# Patient Record
Sex: MALE | Race: OTHER
[De-identification: names, ages, dates, MRNs, and addresses within clinical notes are randomized per-mention and may not be internally consistent; named-entity substitution may affect disease eponyms.]

---

## 2020-03-22 ENCOUNTER — HOSPITAL ENCOUNTER (INPATIENT)
Dept: HOSPITAL 15 - ER | Age: 65
LOS: 5 days | Discharge: HOME | DRG: 871 | End: 2020-03-27
Attending: INTERNAL MEDICINE | Admitting: HOSPITALIST
Payer: COMMERCIAL

## 2020-03-22 VITALS — WEIGHT: 311.95 LBS | BODY MASS INDEX: 36.09 KG/M2 | HEIGHT: 78 IN

## 2020-03-22 DIAGNOSIS — D64.9: ICD-10-CM

## 2020-03-22 DIAGNOSIS — E87.6: ICD-10-CM

## 2020-03-22 DIAGNOSIS — E11.22: ICD-10-CM

## 2020-03-22 DIAGNOSIS — N18.9: ICD-10-CM

## 2020-03-22 DIAGNOSIS — N40.0: ICD-10-CM

## 2020-03-22 DIAGNOSIS — R63.4: ICD-10-CM
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
## 2020-03-22 DIAGNOSIS — S09.8XXA: ICD-10-CM

## 2020-03-22 DIAGNOSIS — Z79.01: ICD-10-CM
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
## 2020-03-22 DIAGNOSIS — E66.9: ICD-10-CM

## 2020-03-22 DIAGNOSIS — I82.409: ICD-10-CM

## 2020-03-22 DIAGNOSIS — N17.9: ICD-10-CM

## 2020-03-22 DIAGNOSIS — S09.90XA: ICD-10-CM

## 2020-03-22 DIAGNOSIS — R31.9: ICD-10-CM

## 2020-03-22 DIAGNOSIS — E78.5: ICD-10-CM

## 2020-03-22 DIAGNOSIS — C67.9: ICD-10-CM

## 2020-03-22 DIAGNOSIS — I48.91: ICD-10-CM

## 2020-03-22 DIAGNOSIS — R65.21: ICD-10-CM

## 2020-03-22 DIAGNOSIS — I50.9: ICD-10-CM

## 2020-03-22 DIAGNOSIS — D68.59: ICD-10-CM

## 2020-03-22 DIAGNOSIS — E11.65: ICD-10-CM

## 2020-03-22 DIAGNOSIS — B95.8: ICD-10-CM

## 2020-03-22 DIAGNOSIS — I13.0: ICD-10-CM

## 2020-03-22 DIAGNOSIS — A41.9: Primary | ICD-10-CM
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
## 2020-03-22 DIAGNOSIS — I71.2: ICD-10-CM

## 2020-03-22 DIAGNOSIS — N39.0: ICD-10-CM

## 2020-03-22 DIAGNOSIS — T50.991A: ICD-10-CM

## 2020-03-22 LAB
ALBUMIN SERPL-MCNC: 2.2 G/DL (ref 3.4–5)
ALP SERPL-CCNC: 74 U/L (ref 45–117)
ALT SERPL-CCNC: 23 U/L (ref 16–61)
ANION GAP SERPL CALCULATED.3IONS-SCNC: 8 MMOL/L (ref 5–15)
APTT PPP: 54.9 SEC (ref 23.64–32.05)
BILIRUB SERPL-MCNC: 0.3 MG/DL (ref 0.2–1)
BUN SERPL-MCNC: 47 MG/DL (ref 7–18)
BUN/CREAT SERPL: 18.4
CALCIUM SERPL-MCNC: 8.7 MG/DL (ref 8.5–10.1)
CHLORIDE SERPL-SCNC: 112 MMOL/L (ref 98–107)
CO2 SERPL-SCNC: 15 MMOL/L (ref 21–32)
GLUCOSE SERPL-MCNC: 135 MG/DL (ref 74–106)
HCT VFR BLD AUTO: 25.7 % (ref 41–53)
HGB BLD-MCNC: 8.1 G/DL (ref 13.5–17.5)
INR PPP: 4.89 (ref 0.9–1.15)
MAGNESIUM SERPL-MCNC: 2.5 MG/DL (ref 1.6–2.6)
MCH RBC QN AUTO: 24.2 PG (ref 28–32)
MCV RBC AUTO: 76.9 FL (ref 80–100)
NRBC BLD QL AUTO: 0 %
POTASSIUM SERPL-SCNC: 3.2 MMOL/L (ref 3.5–5.1)
PROT SERPL-MCNC: 6.5 G/DL (ref 6.4–8.2)
SODIUM SERPL-SCNC: 135 MMOL/L (ref 136–145)

## 2020-03-22 PROCEDURE — 83735 ASSAY OF MAGNESIUM: CPT

## 2020-03-22 PROCEDURE — 84484 ASSAY OF TROPONIN QUANT: CPT

## 2020-03-22 PROCEDURE — 80048 BASIC METABOLIC PNL TOTAL CA: CPT

## 2020-03-22 PROCEDURE — 83605 ASSAY OF LACTIC ACID: CPT

## 2020-03-22 PROCEDURE — 82805 BLOOD GASES W/O2 SATURATION: CPT

## 2020-03-22 PROCEDURE — 86901 BLOOD TYPING SEROLOGIC RH(D): CPT

## 2020-03-22 PROCEDURE — 80307 DRUG TEST PRSMV CHEM ANLYZR: CPT

## 2020-03-22 PROCEDURE — 76775 US EXAM ABDO BACK WALL LIM: CPT

## 2020-03-22 PROCEDURE — 85025 COMPLETE CBC W/AUTO DIFF WBC: CPT

## 2020-03-22 PROCEDURE — 82607 VITAMIN B-12: CPT

## 2020-03-22 PROCEDURE — 80061 LIPID PANEL: CPT

## 2020-03-22 PROCEDURE — 99291 CRITICAL CARE FIRST HOUR: CPT

## 2020-03-22 PROCEDURE — 87040 BLOOD CULTURE FOR BACTERIA: CPT

## 2020-03-22 PROCEDURE — 71250 CT THORAX DX C-: CPT

## 2020-03-22 PROCEDURE — 72125 CT NECK SPINE W/O DYE: CPT

## 2020-03-22 PROCEDURE — 84154 ASSAY OF PSA FREE: CPT

## 2020-03-22 PROCEDURE — 80053 COMPREHEN METABOLIC PANEL: CPT

## 2020-03-22 PROCEDURE — 84132 ASSAY OF SERUM POTASSIUM: CPT

## 2020-03-22 PROCEDURE — 36415 COLL VENOUS BLD VENIPUNCTURE: CPT

## 2020-03-22 PROCEDURE — 86920 COMPATIBILITY TEST SPIN: CPT

## 2020-03-22 PROCEDURE — 82746 ASSAY OF FOLIC ACID SERUM: CPT

## 2020-03-22 PROCEDURE — 83036 HEMOGLOBIN GLYCOSYLATED A1C: CPT

## 2020-03-22 PROCEDURE — 86900 BLOOD TYPING SEROLOGIC ABO: CPT

## 2020-03-22 PROCEDURE — 87081 CULTURE SCREEN ONLY: CPT

## 2020-03-22 PROCEDURE — 83880 ASSAY OF NATRIURETIC PEPTIDE: CPT

## 2020-03-22 PROCEDURE — 86850 RBC ANTIBODY SCREEN: CPT

## 2020-03-22 PROCEDURE — 85610 PROTHROMBIN TIME: CPT

## 2020-03-22 PROCEDURE — 81001 URINALYSIS AUTO W/SCOPE: CPT

## 2020-03-22 PROCEDURE — 96375 TX/PRO/DX INJ NEW DRUG ADDON: CPT

## 2020-03-22 PROCEDURE — 96365 THER/PROPH/DIAG IV INF INIT: CPT

## 2020-03-22 PROCEDURE — 70450 CT HEAD/BRAIN W/O DYE: CPT

## 2020-03-22 PROCEDURE — 84100 ASSAY OF PHOSPHORUS: CPT

## 2020-03-22 PROCEDURE — 82962 GLUCOSE BLOOD TEST: CPT

## 2020-03-22 PROCEDURE — 97163 PT EVAL HIGH COMPLEX 45 MIN: CPT

## 2020-03-22 PROCEDURE — 84443 ASSAY THYROID STIM HORMONE: CPT

## 2020-03-22 PROCEDURE — 82010 KETONE BODYS QUAN: CPT

## 2020-03-22 PROCEDURE — 96366 THER/PROPH/DIAG IV INF ADDON: CPT

## 2020-03-22 PROCEDURE — 85730 THROMBOPLASTIN TIME PARTIAL: CPT

## 2020-03-22 PROCEDURE — 80202 ASSAY OF VANCOMYCIN: CPT

## 2020-03-22 PROCEDURE — 36600 WITHDRAWAL OF ARTERIAL BLOOD: CPT

## 2020-03-22 PROCEDURE — 87186 SC STD MICRODIL/AGAR DIL: CPT

## 2020-03-22 PROCEDURE — 93306 TTE W/DOPPLER COMPLETE: CPT

## 2020-03-22 PROCEDURE — 87086 URINE CULTURE/COLONY COUNT: CPT

## 2020-03-22 PROCEDURE — 74176 CT ABD & PELVIS W/O CONTRAST: CPT

## 2020-03-22 PROCEDURE — 87077 CULTURE AEROBIC IDENTIFY: CPT

## 2020-03-22 PROCEDURE — 71045 X-RAY EXAM CHEST 1 VIEW: CPT

## 2020-03-22 RX ADMIN — NOREPINEPHRINE BITARTRATE SCH MLS/HR: 1 INJECTION, SOLUTION, CONCENTRATE INTRAVENOUS at 20:20

## 2020-03-23 LAB
ALCOHOL, URINE: < 3 MG/DL (ref 0–5)
AMPHETAMINES UR QL SCN: NEGATIVE
ANION GAP SERPL CALCULATED.3IONS-SCNC: 10 MMOL/L (ref 5–15)
ANION GAP SERPL CALCULATED.3IONS-SCNC: 11 MMOL/L (ref 5–15)
BARBITURATES UR QL SCN: NEGATIVE
BENZODIAZ UR QL SCN: POSITIVE
BUN SERPL-MCNC: 36 MG/DL (ref 7–18)
BUN SERPL-MCNC: 42 MG/DL (ref 7–18)
BUN/CREAT SERPL: 17.2
BUN/CREAT SERPL: 18.1
BZE UR QL SCN: NEGATIVE
CALCIUM SERPL-MCNC: 8.6 MG/DL (ref 8.5–10.1)
CALCIUM SERPL-MCNC: 8.9 MG/DL (ref 8.5–10.1)
CANNABINOIDS UR QL SCN: NEGATIVE
CHLORIDE SERPL-SCNC: 109 MMOL/L (ref 98–107)
CHLORIDE SERPL-SCNC: 113 MMOL/L (ref 98–107)
CHOLEST SERPL-MCNC: 103 MG/DL (ref ?–200)
CO2 SERPL-SCNC: 12 MMOL/L (ref 21–32)
CO2 SERPL-SCNC: 15 MMOL/L (ref 21–32)
GLUCOSE SERPL-MCNC: 136 MG/DL (ref 74–106)
GLUCOSE SERPL-MCNC: 222 MG/DL (ref 74–106)
HCT VFR BLD AUTO: 29.3 % (ref 41–53)
HDLC SERPL-MCNC: 17 MG/DL (ref 40–59)
HGB BLD-MCNC: 9.1 G/DL (ref 13.5–17.5)
INR PPP: 5.02 (ref 0.9–1.15)
MAGNESIUM SERPL-MCNC: 1.9 MG/DL (ref 1.6–2.6)
MCH RBC QN AUTO: 24.1 PG (ref 28–32)
MCV RBC AUTO: 77.8 FL (ref 80–100)
NRBC BLD QL AUTO: 0 %
OPIATES UR QL SCN: NEGATIVE
PCP UR QL SCN: NEGATIVE
POTASSIUM SERPL-SCNC: 2.9 MMOL/L (ref 3.5–5.1)
POTASSIUM SERPL-SCNC: 3.3 MMOL/L (ref 3.5–5.1)
SODIUM SERPL-SCNC: 134 MMOL/L (ref 136–145)
SODIUM SERPL-SCNC: 136 MMOL/L (ref 136–145)
TRIGL SERPL-MCNC: 106 MG/DL (ref ?–150)
WBC CLUMPS UR QL AUTO: PRESENT /HPF

## 2020-03-23 RX ADMIN — Medication SCH STRIP: at 20:00

## 2020-03-23 RX ADMIN — DEXTROSE SCH MLS/HR: 5 SOLUTION INTRAVENOUS at 15:28

## 2020-03-23 RX ADMIN — MORPHINE SULFATE PRN MG: 2 INJECTION, SOLUTION INTRAMUSCULAR; INTRAVENOUS at 22:36

## 2020-03-23 RX ADMIN — NOREPINEPHRINE BITARTRATE SCH MLS/HR: 1 INJECTION, SOLUTION, CONCENTRATE INTRAVENOUS at 14:38

## 2020-03-23 RX ADMIN — Medication SCH STRIP: at 16:00

## 2020-03-23 RX ADMIN — HUMAN INSULIN SCH UNITS: 100 INJECTION, SOLUTION SUBCUTANEOUS at 04:00

## 2020-03-23 RX ADMIN — HUMAN INSULIN SCH UNITS: 100 INJECTION, SOLUTION SUBCUTANEOUS at 16:00

## 2020-03-23 RX ADMIN — HUMAN INSULIN SCH UNITS: 100 INJECTION, SOLUTION SUBCUTANEOUS at 08:00

## 2020-03-23 RX ADMIN — DEXTROSE SCH MLS/HR: 5 SOLUTION INTRAVENOUS at 22:15

## 2020-03-23 RX ADMIN — DOBUTAMINE IN DEXTROSE SCH MLS/HR: 100 INJECTION, SOLUTION INTRAVENOUS at 23:21

## 2020-03-23 RX ADMIN — HUMAN INSULIN SCH UNITS: 100 INJECTION, SOLUTION SUBCUTANEOUS at 13:30

## 2020-03-23 RX ADMIN — POTASSIUM CHLORIDE SCH MLS/HR: 200 INJECTION, SOLUTION INTRAVENOUS at 05:33

## 2020-03-23 RX ADMIN — Medication SCH STRIP: at 08:28

## 2020-03-23 RX ADMIN — Medication SCH STRIP: at 12:00

## 2020-03-23 RX ADMIN — NOREPINEPHRINE BITARTRATE SCH MLS/HR: 1 INJECTION, SOLUTION, CONCENTRATE INTRAVENOUS at 19:33

## 2020-03-23 RX ADMIN — DOBUTAMINE IN DEXTROSE SCH MLS/HR: 100 INJECTION, SOLUTION INTRAVENOUS at 17:13

## 2020-03-23 RX ADMIN — MORPHINE SULFATE PRN MG: 2 INJECTION, SOLUTION INTRAMUSCULAR; INTRAVENOUS at 22:01

## 2020-03-23 RX ADMIN — Medication SCH STRIP: at 04:06

## 2020-03-23 RX ADMIN — HUMAN INSULIN SCH UNITS: 100 INJECTION, SOLUTION SUBCUTANEOUS at 20:00

## 2020-03-23 RX ADMIN — ONDANSETRON HYDROCHLORIDE PRN MG: 2 INJECTION, SOLUTION INTRAMUSCULAR; INTRAVENOUS at 22:01

## 2020-03-23 RX ADMIN — CEFTRIAXONE SODIUM SCH MLS/HR: 1 INJECTION, POWDER, FOR SOLUTION INTRAMUSCULAR; INTRAVENOUS at 02:23

## 2020-03-23 RX ADMIN — POTASSIUM CHLORIDE SCH MLS/HR: 200 INJECTION, SOLUTION INTRAVENOUS at 03:32

## 2020-03-24 VITALS — DIASTOLIC BLOOD PRESSURE: 55 MMHG | SYSTOLIC BLOOD PRESSURE: 108 MMHG

## 2020-03-24 VITALS — DIASTOLIC BLOOD PRESSURE: 54 MMHG | SYSTOLIC BLOOD PRESSURE: 113 MMHG

## 2020-03-24 VITALS — DIASTOLIC BLOOD PRESSURE: 60 MMHG | SYSTOLIC BLOOD PRESSURE: 118 MMHG

## 2020-03-24 VITALS — SYSTOLIC BLOOD PRESSURE: 121 MMHG | DIASTOLIC BLOOD PRESSURE: 60 MMHG

## 2020-03-24 VITALS — SYSTOLIC BLOOD PRESSURE: 139 MMHG | DIASTOLIC BLOOD PRESSURE: 49 MMHG

## 2020-03-24 VITALS — DIASTOLIC BLOOD PRESSURE: 48 MMHG | SYSTOLIC BLOOD PRESSURE: 117 MMHG

## 2020-03-24 VITALS — DIASTOLIC BLOOD PRESSURE: 50 MMHG | SYSTOLIC BLOOD PRESSURE: 129 MMHG

## 2020-03-24 VITALS — DIASTOLIC BLOOD PRESSURE: 35 MMHG | SYSTOLIC BLOOD PRESSURE: 95 MMHG

## 2020-03-24 VITALS — SYSTOLIC BLOOD PRESSURE: 143 MMHG | DIASTOLIC BLOOD PRESSURE: 75 MMHG

## 2020-03-24 VITALS — SYSTOLIC BLOOD PRESSURE: 128 MMHG | DIASTOLIC BLOOD PRESSURE: 52 MMHG

## 2020-03-24 VITALS — DIASTOLIC BLOOD PRESSURE: 40 MMHG | SYSTOLIC BLOOD PRESSURE: 125 MMHG

## 2020-03-24 VITALS — DIASTOLIC BLOOD PRESSURE: 46 MMHG | SYSTOLIC BLOOD PRESSURE: 124 MMHG

## 2020-03-24 VITALS — SYSTOLIC BLOOD PRESSURE: 120 MMHG | DIASTOLIC BLOOD PRESSURE: 52 MMHG

## 2020-03-24 VITALS — SYSTOLIC BLOOD PRESSURE: 119 MMHG | DIASTOLIC BLOOD PRESSURE: 90 MMHG

## 2020-03-24 VITALS — SYSTOLIC BLOOD PRESSURE: 114 MMHG | DIASTOLIC BLOOD PRESSURE: 49 MMHG

## 2020-03-24 VITALS — SYSTOLIC BLOOD PRESSURE: 123 MMHG | DIASTOLIC BLOOD PRESSURE: 52 MMHG

## 2020-03-24 VITALS — DIASTOLIC BLOOD PRESSURE: 47 MMHG | SYSTOLIC BLOOD PRESSURE: 108 MMHG

## 2020-03-24 VITALS — DIASTOLIC BLOOD PRESSURE: 55 MMHG | SYSTOLIC BLOOD PRESSURE: 134 MMHG

## 2020-03-24 VITALS — SYSTOLIC BLOOD PRESSURE: 122 MMHG | DIASTOLIC BLOOD PRESSURE: 64 MMHG

## 2020-03-24 VITALS — SYSTOLIC BLOOD PRESSURE: 112 MMHG | DIASTOLIC BLOOD PRESSURE: 42 MMHG

## 2020-03-24 VITALS — SYSTOLIC BLOOD PRESSURE: 111 MMHG | DIASTOLIC BLOOD PRESSURE: 61 MMHG

## 2020-03-24 VITALS — SYSTOLIC BLOOD PRESSURE: 117 MMHG | DIASTOLIC BLOOD PRESSURE: 32 MMHG

## 2020-03-24 VITALS — SYSTOLIC BLOOD PRESSURE: 116 MMHG | DIASTOLIC BLOOD PRESSURE: 58 MMHG

## 2020-03-24 VITALS — SYSTOLIC BLOOD PRESSURE: 146 MMHG | DIASTOLIC BLOOD PRESSURE: 40 MMHG

## 2020-03-24 VITALS — SYSTOLIC BLOOD PRESSURE: 107 MMHG | DIASTOLIC BLOOD PRESSURE: 47 MMHG

## 2020-03-24 VITALS — DIASTOLIC BLOOD PRESSURE: 64 MMHG | SYSTOLIC BLOOD PRESSURE: 100 MMHG

## 2020-03-24 VITALS — SYSTOLIC BLOOD PRESSURE: 114 MMHG | DIASTOLIC BLOOD PRESSURE: 48 MMHG

## 2020-03-24 VITALS — SYSTOLIC BLOOD PRESSURE: 141 MMHG | DIASTOLIC BLOOD PRESSURE: 48 MMHG

## 2020-03-24 VITALS — SYSTOLIC BLOOD PRESSURE: 99 MMHG | DIASTOLIC BLOOD PRESSURE: 43 MMHG

## 2020-03-24 VITALS — DIASTOLIC BLOOD PRESSURE: 52 MMHG | SYSTOLIC BLOOD PRESSURE: 148 MMHG

## 2020-03-24 VITALS — SYSTOLIC BLOOD PRESSURE: 118 MMHG | DIASTOLIC BLOOD PRESSURE: 61 MMHG

## 2020-03-24 VITALS — DIASTOLIC BLOOD PRESSURE: 47 MMHG | SYSTOLIC BLOOD PRESSURE: 115 MMHG

## 2020-03-24 VITALS — SYSTOLIC BLOOD PRESSURE: 125 MMHG | DIASTOLIC BLOOD PRESSURE: 58 MMHG

## 2020-03-24 VITALS — DIASTOLIC BLOOD PRESSURE: 52 MMHG | SYSTOLIC BLOOD PRESSURE: 117 MMHG

## 2020-03-24 VITALS — DIASTOLIC BLOOD PRESSURE: 63 MMHG | SYSTOLIC BLOOD PRESSURE: 122 MMHG

## 2020-03-24 VITALS — SYSTOLIC BLOOD PRESSURE: 111 MMHG | DIASTOLIC BLOOD PRESSURE: 48 MMHG

## 2020-03-24 VITALS — DIASTOLIC BLOOD PRESSURE: 50 MMHG | SYSTOLIC BLOOD PRESSURE: 148 MMHG

## 2020-03-24 VITALS — SYSTOLIC BLOOD PRESSURE: 116 MMHG | DIASTOLIC BLOOD PRESSURE: 53 MMHG

## 2020-03-24 VITALS — DIASTOLIC BLOOD PRESSURE: 64 MMHG | SYSTOLIC BLOOD PRESSURE: 109 MMHG

## 2020-03-24 VITALS — SYSTOLIC BLOOD PRESSURE: 111 MMHG | DIASTOLIC BLOOD PRESSURE: 52 MMHG

## 2020-03-24 VITALS — DIASTOLIC BLOOD PRESSURE: 80 MMHG | SYSTOLIC BLOOD PRESSURE: 108 MMHG

## 2020-03-24 VITALS — DIASTOLIC BLOOD PRESSURE: 59 MMHG | SYSTOLIC BLOOD PRESSURE: 135 MMHG

## 2020-03-24 LAB
ANION GAP SERPL CALCULATED.3IONS-SCNC: 7 MMOL/L (ref 5–15)
ANION GAP SERPL CALCULATED.3IONS-SCNC: 8 MMOL/L (ref 5–15)
BUN SERPL-MCNC: 20 MG/DL (ref 7–18)
BUN SERPL-MCNC: 28 MG/DL (ref 7–18)
BUN/CREAT SERPL: 16.1
BUN/CREAT SERPL: 19.3
CALCIUM SERPL-MCNC: 7.7 MG/DL (ref 8.5–10.1)
CALCIUM SERPL-MCNC: 8.1 MG/DL (ref 8.5–10.1)
CHLORIDE SERPL-SCNC: 113 MMOL/L (ref 98–107)
CHLORIDE SERPL-SCNC: 114 MMOL/L (ref 98–107)
CO2 SERPL-SCNC: 14 MMOL/L (ref 21–32)
CO2 SERPL-SCNC: 18 MMOL/L (ref 21–32)
GLUCOSE SERPL-MCNC: 149 MG/DL (ref 74–106)
GLUCOSE SERPL-MCNC: 168 MG/DL (ref 74–106)
HCT VFR BLD AUTO: 28 % (ref 41–53)
HGB BLD-MCNC: 8.4 G/DL (ref 13.5–17.5)
INR PPP: 6.19 (ref 0.9–1.15)
MAGNESIUM SERPL-MCNC: 1.7 MG/DL (ref 1.6–2.6)
MCH RBC QN AUTO: 23.5 PG (ref 28–32)
MCV RBC AUTO: 78.2 FL (ref 80–100)
NRBC BLD QL AUTO: 0.1 %
POTASSIUM SERPL-SCNC: 3.3 MMOL/L (ref 3.5–5.1)
POTASSIUM SERPL-SCNC: 3.3 MMOL/L (ref 3.5–5.1)
SODIUM SERPL-SCNC: 136 MMOL/L (ref 136–145)
SODIUM SERPL-SCNC: 138 MMOL/L (ref 136–145)

## 2020-03-24 RX ADMIN — CEFTRIAXONE SODIUM SCH MLS/HR: 1 INJECTION, POWDER, FOR SOLUTION INTRAMUSCULAR; INTRAVENOUS at 02:00

## 2020-03-24 RX ADMIN — HUMAN INSULIN SCH UNITS: 100 INJECTION, SOLUTION SUBCUTANEOUS at 16:00

## 2020-03-24 RX ADMIN — DOCUSATE SODIUM PRN MG: 100 CAPSULE, LIQUID FILLED ORAL at 05:34

## 2020-03-24 RX ADMIN — HUMAN INSULIN SCH UNITS: 100 INJECTION, SOLUTION SUBCUTANEOUS at 08:21

## 2020-03-24 RX ADMIN — HUMAN INSULIN SCH UNITS: 100 INJECTION, SOLUTION SUBCUTANEOUS at 04:19

## 2020-03-24 RX ADMIN — Medication SCH STRIP: at 08:20

## 2020-03-24 RX ADMIN — NOREPINEPHRINE BITARTRATE SCH MLS/HR: 1 INJECTION, SOLUTION, CONCENTRATE INTRAVENOUS at 06:36

## 2020-03-24 RX ADMIN — DOBUTAMINE IN DEXTROSE SCH MLS/HR: 100 INJECTION, SOLUTION INTRAVENOUS at 23:53

## 2020-03-24 RX ADMIN — NOREPINEPHRINE BITARTRATE SCH MLS/HR: 1 INJECTION, SOLUTION, CONCENTRATE INTRAVENOUS at 01:35

## 2020-03-24 RX ADMIN — DEXTROSE SCH MLS/HR: 5 SOLUTION INTRAVENOUS at 08:08

## 2020-03-24 RX ADMIN — HUMAN INSULIN SCH UNITS: 100 INJECTION, SOLUTION SUBCUTANEOUS at 20:00

## 2020-03-24 RX ADMIN — Medication SCH STRIP: at 04:15

## 2020-03-24 RX ADMIN — ONDANSETRON HYDROCHLORIDE PRN MG: 2 INJECTION, SOLUTION INTRAMUSCULAR; INTRAVENOUS at 08:31

## 2020-03-24 RX ADMIN — DOBUTAMINE IN DEXTROSE SCH MLS/HR: 100 INJECTION, SOLUTION INTRAVENOUS at 16:19

## 2020-03-24 RX ADMIN — Medication SCH STRIP: at 12:02

## 2020-03-24 RX ADMIN — Medication SCH STRIP: at 00:07

## 2020-03-24 RX ADMIN — MAGNESIUM SULFATE IN DEXTROSE SCH MLS/HR: 10 INJECTION, SOLUTION INTRAVENOUS at 16:24

## 2020-03-24 RX ADMIN — DEXTROSE SCH MLS/HR: 5 SOLUTION INTRAVENOUS at 16:19

## 2020-03-24 RX ADMIN — MAGNESIUM SULFATE IN DEXTROSE SCH MLS/HR: 10 INJECTION, SOLUTION INTRAVENOUS at 12:00

## 2020-03-24 RX ADMIN — DOBUTAMINE IN DEXTROSE SCH MLS/HR: 100 INJECTION, SOLUTION INTRAVENOUS at 10:52

## 2020-03-24 RX ADMIN — Medication SCH STRIP: at 16:19

## 2020-03-24 RX ADMIN — MAGNESIUM SULFATE IN DEXTROSE SCH MLS/HR: 10 INJECTION, SOLUTION INTRAVENOUS at 17:35

## 2020-03-24 RX ADMIN — HUMAN INSULIN SCH UNITS: 100 INJECTION, SOLUTION SUBCUTANEOUS at 00:22

## 2020-03-24 RX ADMIN — Medication SCH STRIP: at 20:11

## 2020-03-24 RX ADMIN — MAGNESIUM SULFATE IN DEXTROSE SCH MLS/HR: 10 INJECTION, SOLUTION INTRAVENOUS at 11:00

## 2020-03-24 RX ADMIN — HUMAN INSULIN SCH UNITS: 100 INJECTION, SOLUTION SUBCUTANEOUS at 12:00

## 2020-03-24 RX ADMIN — DOBUTAMINE IN DEXTROSE SCH MLS/HR: 100 INJECTION, SOLUTION INTRAVENOUS at 05:29

## 2020-03-24 NOTE — NUR
OPENING NOTE

ASSUMED CARE OF PATIENT AT THIS TIME. REPORT RECEIVED FROM DAY SHIFT RN. POC REVIEWED. HEAD 
TO TOE ASSESSMENT COMPLETE, SEE INTERVENTION SPREADSHEET FOR COMPLETE DETAILS. RECEIVED PT 
ALERT AND ORIENTED X4. VSS. IV SITES BENIGN. ALMODOVAR CATHETER DRAINING TO GRAVITY, URINE 
APPEARANCE PINK TINGED. PT COMPLAINS OF PAIN IN URETHRA. PT ON 2LTS NC. CALL LIGHT WITH IN 
REACH. BED LOCKED AND IN LOWEST POSITION, SAFETY PRECAUTIONS IN PLACE. WILL MONITOR PT 
CAREFULLY.

## 2020-03-24 NOTE — NUR
Family updated on pt status

Family of SHAE DONOVAN updated on patient's status and condition. All questions and concerns 
addressed. Wife verbalized understanding.

## 2020-03-24 NOTE — NUR
NEPHROLOGIST AT BEDSIDE

 DR. BISHOP UPDATED ON PATIENT STATUS. 1030 LAB DRAWS TO BE CHANGED TO 1300 TO MONITOR 
POTASSIUM AS MD STATED HE DOES NOT WANT PATIENT TO BECOME HYPERKALEMIC.

## 2020-03-24 NOTE — NUR
Family updated on pt status

Family of SHAE DONOVAN updated on patient's status and condition. All questions and concerns 
addressed. GrandsonMika verbalized understanding.

## 2020-03-24 NOTE — NUR
Pt being admitted to ICU

SHAE DONOVAN admitted to ICU via gurney on cardiac monitor, and portable 02. Patient 
transferred to bed, connected to ICU monitoring and oxygen, and weighed by bedscale. Patient 
oriented to Jennie glez RN, unit, room, bed, and unit policies regarding patient 
care and visiting hours.  All questions and concerns addressed, patient verbalized 
understanding.

PT currently on 30mcg of Levophed to right upper arm midline. Site asymptomatic. Fluids as 
ordered infusing to right hand peripheral iv. Iv patent and flushed. Pt vss. Fry draining 
yellow, cloudy urine to gravity. 

 All belongings reviewed with patient. Patient give food at bedside as coverage was given by 
Er nurse. Patient denies any pain at this time. Skin noted to have right abrasion to knee 
and left knee noted to have ecchymosis. Patient placed on fall precautions. See further 
notes/ orders. 

 Wife, Connie updated on patients status. Questions and concerns addressed. 

NOTE:

## 2020-03-24 NOTE — NUR
CRITICAL INR

DR. WATSON PAGED REGARDING CRITICAL INR 6.19. MD CALLED BACK.  PT TO BE MONITORED FOR ANY 
BLEEDING. INR TO BE CHECKED IN A.M.

## 2020-03-24 NOTE — NUR
MEDICATION HELD AT THIS TIME

IV/ PO POTASSIUM HELD UNTIL 1300 LAB DRAW PER CONVERSATION WITH NEPHROLOGIST.

## 2020-03-24 NOTE — NUR
DESATURATION

PATIENT NOTED TO BE SLEEPING AND REMOVED NC FROM NARES AND SET OVER HEAD. POX NOTED 84%. 
PATIENT AWAKE TO NAME AND PLACED NC BACK IN PLACE. POC 96% ON 2L/MIN NC.  WILL CONTINUE TO 
MONITOR.

## 2020-03-24 NOTE — NUR
NAUSEA

 PATIENT COMPLAINING OF SLIGHT NAUSEA AND STATING HE IS UNCOMFORTABLE SINCE HE HAS NOT HAD A 
BM IN 5 DAYS. MD PAGED TO NOTIFY. NEW ORDERS IN PLACE.

## 2020-03-24 NOTE — NUR
Bed pan

Pt placed on bed hillman. Pt did not have any output. Complete bed bath and linen change done at 
this time. Pt assists with turns. pt tolerated well. VSS. Safety precautions maintained. Bed 
locked and in lowest position, call light within reach.

## 2020-03-24 NOTE — NUR
HOSPITALIST

 DR. WATSON AT BEDSIDE. MD AWARE OF PENDING LABS FOR K/MG COVERAGE. PER MD OK TO FOLLOW 
NEPHROLOGIST RECOMMENDATIONS. SEE NEW ORDERS.

## 2020-03-25 VITALS — SYSTOLIC BLOOD PRESSURE: 129 MMHG | DIASTOLIC BLOOD PRESSURE: 57 MMHG

## 2020-03-25 VITALS — DIASTOLIC BLOOD PRESSURE: 46 MMHG | SYSTOLIC BLOOD PRESSURE: 98 MMHG

## 2020-03-25 VITALS — DIASTOLIC BLOOD PRESSURE: 47 MMHG | SYSTOLIC BLOOD PRESSURE: 122 MMHG

## 2020-03-25 VITALS — DIASTOLIC BLOOD PRESSURE: 55 MMHG | SYSTOLIC BLOOD PRESSURE: 108 MMHG

## 2020-03-25 VITALS — SYSTOLIC BLOOD PRESSURE: 95 MMHG | DIASTOLIC BLOOD PRESSURE: 56 MMHG

## 2020-03-25 VITALS — DIASTOLIC BLOOD PRESSURE: 39 MMHG | SYSTOLIC BLOOD PRESSURE: 115 MMHG

## 2020-03-25 VITALS — SYSTOLIC BLOOD PRESSURE: 117 MMHG | DIASTOLIC BLOOD PRESSURE: 60 MMHG

## 2020-03-25 VITALS — SYSTOLIC BLOOD PRESSURE: 113 MMHG | DIASTOLIC BLOOD PRESSURE: 53 MMHG

## 2020-03-25 VITALS — DIASTOLIC BLOOD PRESSURE: 54 MMHG | SYSTOLIC BLOOD PRESSURE: 129 MMHG

## 2020-03-25 VITALS — DIASTOLIC BLOOD PRESSURE: 60 MMHG | SYSTOLIC BLOOD PRESSURE: 137 MMHG

## 2020-03-25 VITALS — SYSTOLIC BLOOD PRESSURE: 118 MMHG | DIASTOLIC BLOOD PRESSURE: 70 MMHG

## 2020-03-25 VITALS — SYSTOLIC BLOOD PRESSURE: 117 MMHG | DIASTOLIC BLOOD PRESSURE: 52 MMHG

## 2020-03-25 VITALS — DIASTOLIC BLOOD PRESSURE: 79 MMHG | SYSTOLIC BLOOD PRESSURE: 99 MMHG

## 2020-03-25 VITALS — SYSTOLIC BLOOD PRESSURE: 145 MMHG | DIASTOLIC BLOOD PRESSURE: 54 MMHG

## 2020-03-25 VITALS — DIASTOLIC BLOOD PRESSURE: 53 MMHG | SYSTOLIC BLOOD PRESSURE: 117 MMHG

## 2020-03-25 VITALS — DIASTOLIC BLOOD PRESSURE: 52 MMHG | SYSTOLIC BLOOD PRESSURE: 120 MMHG

## 2020-03-25 VITALS — SYSTOLIC BLOOD PRESSURE: 123 MMHG | DIASTOLIC BLOOD PRESSURE: 68 MMHG

## 2020-03-25 VITALS — DIASTOLIC BLOOD PRESSURE: 36 MMHG | SYSTOLIC BLOOD PRESSURE: 95 MMHG

## 2020-03-25 VITALS — SYSTOLIC BLOOD PRESSURE: 99 MMHG | DIASTOLIC BLOOD PRESSURE: 42 MMHG

## 2020-03-25 VITALS — DIASTOLIC BLOOD PRESSURE: 47 MMHG | SYSTOLIC BLOOD PRESSURE: 116 MMHG

## 2020-03-25 VITALS — SYSTOLIC BLOOD PRESSURE: 113 MMHG | DIASTOLIC BLOOD PRESSURE: 55 MMHG

## 2020-03-25 VITALS — DIASTOLIC BLOOD PRESSURE: 61 MMHG | SYSTOLIC BLOOD PRESSURE: 109 MMHG

## 2020-03-25 LAB
ANION GAP SERPL CALCULATED.3IONS-SCNC: 9 MMOL/L (ref 5–15)
BUN SERPL-MCNC: 15 MG/DL (ref 7–18)
BUN/CREAT SERPL: 14.7
CALCIUM SERPL-MCNC: 7.9 MG/DL (ref 8.5–10.1)
CHLORIDE SERPL-SCNC: 111 MMOL/L (ref 98–107)
CO2 SERPL-SCNC: 20 MMOL/L (ref 21–32)
GLUCOSE SERPL-MCNC: 146 MG/DL (ref 74–106)
HCT VFR BLD AUTO: 24.7 % (ref 41–53)
HGB BLD-MCNC: 7.9 G/DL (ref 13.5–17.5)
INR PPP: 5.89 (ref 0.9–1.15)
MAGNESIUM SERPL-MCNC: 2.1 MG/DL (ref 1.6–2.6)
MCH RBC QN AUTO: 24.1 PG (ref 28–32)
MCV RBC AUTO: 75.5 FL (ref 80–100)
NRBC BLD QL AUTO: 0.2 %
POTASSIUM SERPL-SCNC: 3.5 MMOL/L (ref 3.5–5.1)
SODIUM SERPL-SCNC: 140 MMOL/L (ref 136–145)

## 2020-03-25 RX ADMIN — HUMAN INSULIN SCH UNITS: 100 INJECTION, SOLUTION SUBCUTANEOUS at 00:00

## 2020-03-25 RX ADMIN — Medication SCH STRIP: at 19:54

## 2020-03-25 RX ADMIN — HUMAN INSULIN SCH UNITS: 100 INJECTION, SOLUTION SUBCUTANEOUS at 19:53

## 2020-03-25 RX ADMIN — TAMSULOSIN HYDROCHLORIDE SCH MG: 0.4 CAPSULE ORAL at 17:51

## 2020-03-25 RX ADMIN — CEFTRIAXONE SODIUM SCH MLS/HR: 1 INJECTION, POWDER, FOR SOLUTION INTRAMUSCULAR; INTRAVENOUS at 02:00

## 2020-03-25 RX ADMIN — Medication SCH STRIP: at 11:35

## 2020-03-25 RX ADMIN — Medication SCH STRIP: at 04:00

## 2020-03-25 RX ADMIN — HUMAN INSULIN SCH UNITS: 100 INJECTION, SOLUTION SUBCUTANEOUS at 23:18

## 2020-03-25 RX ADMIN — DEXTROSE SCH MLS/HR: 5 SOLUTION INTRAVENOUS at 10:25

## 2020-03-25 RX ADMIN — HUMAN INSULIN SCH UNITS: 100 INJECTION, SOLUTION SUBCUTANEOUS at 04:00

## 2020-03-25 RX ADMIN — HUMAN INSULIN SCH UNITS: 100 INJECTION, SOLUTION SUBCUTANEOUS at 08:00

## 2020-03-25 RX ADMIN — DOBUTAMINE IN DEXTROSE SCH MLS/HR: 100 INJECTION, SOLUTION INTRAVENOUS at 12:09

## 2020-03-25 RX ADMIN — Medication SCH STRIP: at 00:25

## 2020-03-25 RX ADMIN — ONDANSETRON HYDROCHLORIDE PRN MG: 2 INJECTION, SOLUTION INTRAMUSCULAR; INTRAVENOUS at 19:53

## 2020-03-25 RX ADMIN — DEXTROSE SCH MLS/HR: 5 SOLUTION INTRAVENOUS at 01:44

## 2020-03-25 RX ADMIN — VANCOMYCIN HYDROCHLORIDE SCH MLS/HR: 1 INJECTION, POWDER, LYOPHILIZED, FOR SOLUTION INTRAVENOUS at 17:51

## 2020-03-25 RX ADMIN — VANCOMYCIN HYDROCHLORIDE SCH MLS/HR: 1 INJECTION, POWDER, LYOPHILIZED, FOR SOLUTION INTRAVENOUS at 22:51

## 2020-03-25 RX ADMIN — Medication SCH STRIP: at 23:18

## 2020-03-25 RX ADMIN — Medication SCH STRIP: at 08:00

## 2020-03-25 RX ADMIN — Medication SCH STRIP: at 16:22

## 2020-03-25 RX ADMIN — HUMAN INSULIN SCH UNITS: 100 INJECTION, SOLUTION SUBCUTANEOUS at 16:24

## 2020-03-25 RX ADMIN — HUMAN INSULIN SCH UNITS: 100 INJECTION, SOLUTION SUBCUTANEOUS at 11:38

## 2020-03-25 RX ADMIN — DOBUTAMINE IN DEXTROSE SCH MLS/HR: 100 INJECTION, SOLUTION INTRAVENOUS at 06:01

## 2020-03-25 NOTE — NUR
assessment

Patient is a 64 year old male who is in ICU. Per patients wife Connie prior to admission he 
lived home with her and functioned independently. Per Connie patient has crutches for home 
use. Patients PCP is Dr Harrison at [facility]. Per Connie patient went to Urgent care 
for black stools and they sent him to ER at [facility]. Patient was then admitted. 
Once released last weekend he came home and his blood pressure dropped and Connie called 911 
and patient was brought to [facility] and admitted. I informed Connie that patients post discharge 
needs to be determined prior to discharge. Connie verbalized understanding.

-------------------------------------------------------------------------------

Addendum: 03/25/20 at 1554 by Mallory BLANCHARD

-------------------------------------------------------------------------------

Amended: Links added.

## 2020-03-25 NOTE — NUR
AMBULATION

Patient found standing at bedside. Patient ambulated without call for assistance. Patient 
educated on risk for falls and safety with ambulation. Patient returned to chair and 
instructed to call for assistance. Patient stated he felt like he needed to urinate. Small 
clot noted in Fry catheter line, manual irrigation performed and clot removed. Urine 
cherry color, free flowing at this time. Patient denies discomfort. Will continue to 
monitor.

## 2020-03-25 NOTE — NUR
PHYSICAL THERAPY



Patient ambulated one lap around nursing station with two Physical therapist, walker, and 
oxygen. Patient tolerated activity well. Patient now resting in chair with call light within 
reach. Patient instructed to call for assistance and not attempt to get out of bed. Patient 
verbalized understanding.

## 2020-03-25 NOTE — NUR
ACTIVITY



Patient ambulated to bedside chair with walker and standby assistance. Patient tolerated 
activity well. No distress noted. Vital signs remain stable during activity.

## 2020-03-25 NOTE — NUR
ACTIVITY



Patient stated he was tired and wanting to return to bed for a nap. Ambulated back back to 
bed with minimal to standby assistance. Patient was reaching for furniture for assistance. 
Patient instructed to stand up straight. Patient positioned self in bed. Vitals stable. 
Complete linen change completed prior to patient returning to bed. Bed locked in lowest 
position, call light within reach. Patient instructed to call for assistance. Patient 
verbalized understanding.

## 2020-03-25 NOTE — NUR
MD VISIT: NEPHROLOGY



 at bedside speaking with patient regarding plan of care. No new orders at this 
time.

## 2020-03-25 NOTE — NUR
LAB CONTACTED



INFORMED THEM OF NECESSITY OF ON TIME VANCO TROUGH AT 0400 ON 2/26. LAB VERIFIED ORDER AND 
STATED A NOTE WILL BE LEFT TO ONCOMING SHIFT TO ENSURE ON TIME DRAW.

## 2020-03-25 NOTE — NUR
MD VISIT: PCP



 at bedside. MD aware of labs, IV medications, and vitals signs. MD okay with 
downgrading patient to tele unit.

## 2020-03-25 NOTE — NUR
INITIAL ASSESSMENT

Report received from Sarina HERNANDEZ, care assumed. Patient is awake, alert, and oriented. 
Patient denies pain or distress at this time. Patient able to follow commands and assist 
with repositioning. Vitals stable. Patient on room air, lungs clear with oxygen saturation 
95%, pt denies shortness of breath. Patient stated he has had hematuria on and off for a 
year. Urine in yellow with clots present in Fry catheter that is secure, and hung below 
bladder. Patient has poor appetite, but requesting apples this morning. Patient denies 
flatus. Will administer PRN constipation medication. See skin assessment and IV spreadsheet. 
Bed locked in lowest position, call light within reach. Patient instructed to call for 
assistance, patient verbalized understanding.

## 2020-03-25 NOTE — NUR
ICU patient trans to floor

SHAE DONOVAN transferred to 248A via bed on TELE#4. All patient medications and personal 
belongings transferred with patient to receiving floor. Patient care transferred to Sepideh HERNANDEZ. Bed alarm placed, bed locked in lowest position.

## 2020-03-25 NOTE — NUR
BED PAN



Patient placed on bed pan. Patient passing gas but unable to have bowel movement at this 
time. Vitals remain stable. Positioned with head of bed elevated, call light within reach. 
Physical therapist paged to bedside for ambulation.

## 2020-03-26 VITALS — DIASTOLIC BLOOD PRESSURE: 68 MMHG | SYSTOLIC BLOOD PRESSURE: 141 MMHG

## 2020-03-26 VITALS — DIASTOLIC BLOOD PRESSURE: 41 MMHG | SYSTOLIC BLOOD PRESSURE: 105 MMHG

## 2020-03-26 VITALS — DIASTOLIC BLOOD PRESSURE: 57 MMHG | SYSTOLIC BLOOD PRESSURE: 126 MMHG

## 2020-03-26 VITALS — DIASTOLIC BLOOD PRESSURE: 53 MMHG | SYSTOLIC BLOOD PRESSURE: 94 MMHG

## 2020-03-26 VITALS — SYSTOLIC BLOOD PRESSURE: 105 MMHG | DIASTOLIC BLOOD PRESSURE: 45 MMHG

## 2020-03-26 VITALS — SYSTOLIC BLOOD PRESSURE: 104 MMHG | DIASTOLIC BLOOD PRESSURE: 53 MMHG

## 2020-03-26 VITALS — DIASTOLIC BLOOD PRESSURE: 51 MMHG | SYSTOLIC BLOOD PRESSURE: 98 MMHG

## 2020-03-26 VITALS — DIASTOLIC BLOOD PRESSURE: 54 MMHG | SYSTOLIC BLOOD PRESSURE: 115 MMHG

## 2020-03-26 LAB
ANION GAP SERPL CALCULATED.3IONS-SCNC: 10 MMOL/L (ref 5–15)
BUN SERPL-MCNC: 11 MG/DL (ref 7–18)
BUN/CREAT SERPL: 12.9
CALCIUM SERPL-MCNC: 7.5 MG/DL (ref 8.5–10.1)
CHLORIDE SERPL-SCNC: 108 MMOL/L (ref 98–107)
CO2 SERPL-SCNC: 21 MMOL/L (ref 21–32)
GLUCOSE SERPL-MCNC: 154 MG/DL (ref 74–106)
HCT VFR BLD AUTO: 23.1 % (ref 41–53)
HGB BLD-MCNC: 7.4 G/DL (ref 13.5–17.5)
INR PPP: 3.97 (ref 0.9–1.15)
MCH RBC QN AUTO: 24.2 PG (ref 28–32)
MCV RBC AUTO: 75.1 FL (ref 80–100)
NRBC BLD QL AUTO: 0.1 %
POTASSIUM SERPL-SCNC: 2.9 MMOL/L (ref 3.5–5.1)
SODIUM SERPL-SCNC: 139 MMOL/L (ref 136–145)

## 2020-03-26 PROCEDURE — 30233N1 TRANSFUSION OF NONAUTOLOGOUS RED BLOOD CELLS INTO PERIPHERAL VEIN, PERCUTANEOUS APPROACH: ICD-10-PCS | Performed by: HOSPITALIST

## 2020-03-26 RX ADMIN — DOCUSATE SODIUM PRN MG: 100 CAPSULE, LIQUID FILLED ORAL at 06:15

## 2020-03-26 RX ADMIN — CEFTRIAXONE SODIUM SCH MLS/HR: 1 INJECTION, POWDER, FOR SOLUTION INTRAMUSCULAR; INTRAVENOUS at 01:17

## 2020-03-26 RX ADMIN — HUMAN INSULIN SCH UNITS: 100 INJECTION, SOLUTION SUBCUTANEOUS at 04:00

## 2020-03-26 RX ADMIN — Medication SCH STRIP: at 04:00

## 2020-03-26 RX ADMIN — VANCOMYCIN HYDROCHLORIDE SCH MLS/HR: 1 INJECTION, POWDER, LYOPHILIZED, FOR SOLUTION INTRAVENOUS at 04:53

## 2020-03-26 RX ADMIN — VANCOMYCIN HYDROCHLORIDE SCH MLS/HR: 1 INJECTION, POWDER, LYOPHILIZED, FOR SOLUTION INTRAVENOUS at 11:48

## 2020-03-26 RX ADMIN — VANCOMYCIN HYDROCHLORIDE SCH MLS/HR: 1 INJECTION, POWDER, LYOPHILIZED, FOR SOLUTION INTRAVENOUS at 23:00

## 2020-03-26 RX ADMIN — MORPHINE SULFATE PRN MG: 2 INJECTION, SOLUTION INTRAMUSCULAR; INTRAVENOUS at 16:16

## 2020-03-26 RX ADMIN — Medication SCH STRIP: at 22:28

## 2020-03-26 RX ADMIN — HUMAN INSULIN SCH UNITS: 100 INJECTION, SOLUTION SUBCUTANEOUS at 07:57

## 2020-03-26 RX ADMIN — MORPHINE SULFATE PRN MG: 2 INJECTION, SOLUTION INTRAMUSCULAR; INTRAVENOUS at 22:11

## 2020-03-26 RX ADMIN — HUMAN INSULIN SCH UNITS: 100 INJECTION, SOLUTION SUBCUTANEOUS at 18:00

## 2020-03-26 RX ADMIN — VANCOMYCIN HYDROCHLORIDE SCH MLS/HR: 1 INJECTION, POWDER, LYOPHILIZED, FOR SOLUTION INTRAVENOUS at 17:55

## 2020-03-26 RX ADMIN — Medication SCH STRIP: at 11:27

## 2020-03-26 RX ADMIN — Medication SCH STRIP: at 07:56

## 2020-03-26 RX ADMIN — TAMSULOSIN HYDROCHLORIDE SCH MG: 0.4 CAPSULE ORAL at 17:55

## 2020-03-26 RX ADMIN — HUMAN INSULIN SCH UNITS: 100 INJECTION, SOLUTION SUBCUTANEOUS at 11:30

## 2020-03-26 RX ADMIN — Medication SCH STRIP: at 17:55

## 2020-03-26 NOTE — NUR
opening note



pt A&Ox4.  respirations are even and non labored on room air.  watson in place, no kinks 
below bladder off floor and draining to gravity.  fall precautions.  bed in low locked 
position, call light within reach.

## 2020-03-26 NOTE — NUR
HOSPITALIST ESTIVEN



PATIENT HAS A CRITICAL LAB VALUE. PATIENT'S POTASSIUM IS NOW 2.9. THIS IS A DECREASE FROM 
3.5. PATIENT IS ASYMPTOMATIC AND AOX4 AND AMBULATORY. WILL AWAIT CALL BACK OR ORDERS.

-------------------------------------------------------------------------------

Addendum: 03/26/20 at 0510 by KEVIN WOOD RN

-------------------------------------------------------------------------------

ORDERS RECEIVED. WILL IMPLEMENT ONCE AVAILABLE.

## 2020-03-26 NOTE — NUR
Patient refused PT. MARY Bunn was notified. 

-------------------------------------------------------------------------------

Addendum: 03/26/20 at 1645 by HUGH LAMB PTT

-------------------------------------------------------------------------------

Amended: Links added.

## 2020-03-27 VITALS — SYSTOLIC BLOOD PRESSURE: 137 MMHG | DIASTOLIC BLOOD PRESSURE: 76 MMHG

## 2020-03-27 VITALS — DIASTOLIC BLOOD PRESSURE: 59 MMHG | SYSTOLIC BLOOD PRESSURE: 124 MMHG

## 2020-03-27 VITALS — SYSTOLIC BLOOD PRESSURE: 145 MMHG | DIASTOLIC BLOOD PRESSURE: 80 MMHG

## 2020-03-27 VITALS — DIASTOLIC BLOOD PRESSURE: 42 MMHG | SYSTOLIC BLOOD PRESSURE: 115 MMHG

## 2020-03-27 LAB
ANION GAP SERPL CALCULATED.3IONS-SCNC: 6 MMOL/L (ref 5–15)
BUN SERPL-MCNC: 8 MG/DL (ref 7–18)
BUN/CREAT SERPL: 9.3
CALCIUM SERPL-MCNC: 7.7 MG/DL (ref 8.5–10.1)
CHLORIDE SERPL-SCNC: 110 MMOL/L (ref 98–107)
CO2 SERPL-SCNC: 21 MMOL/L (ref 21–32)
GLUCOSE SERPL-MCNC: 215 MG/DL (ref 74–106)
HCT VFR BLD AUTO: 24.4 % (ref 41–53)
HGB BLD-MCNC: 7.9 G/DL (ref 13.5–17.5)
INR PPP: 1.73 (ref 0.9–1.15)
MAGNESIUM SERPL-MCNC: 1.8 MG/DL (ref 1.6–2.6)
MCH RBC QN AUTO: 24.8 PG (ref 28–32)
MCV RBC AUTO: 76.4 FL (ref 80–100)
NRBC BLD QL AUTO: 0 %
POTASSIUM SERPL-SCNC: 2.9 MMOL/L (ref 3.5–5.1)
SODIUM SERPL-SCNC: 137 MMOL/L (ref 136–145)

## 2020-03-27 RX ADMIN — Medication SCH STRIP: at 11:56

## 2020-03-27 RX ADMIN — Medication SCH STRIP: at 06:21

## 2020-03-27 RX ADMIN — MORPHINE SULFATE PRN MG: 2 INJECTION, SOLUTION INTRAMUSCULAR; INTRAVENOUS at 04:58

## 2020-03-27 RX ADMIN — VANCOMYCIN HYDROCHLORIDE SCH MLS/HR: 1 INJECTION, POWDER, LYOPHILIZED, FOR SOLUTION INTRAVENOUS at 04:47

## 2020-03-27 RX ADMIN — HUMAN INSULIN SCH UNITS: 100 INJECTION, SOLUTION SUBCUTANEOUS at 11:58

## 2020-03-27 RX ADMIN — HUMAN INSULIN SCH UNITS: 100 INJECTION, SOLUTION SUBCUTANEOUS at 06:29

## 2020-03-27 RX ADMIN — CEFTRIAXONE SODIUM SCH MLS/HR: 1 INJECTION, POWDER, FOR SOLUTION INTRAMUSCULAR; INTRAVENOUS at 02:00

## 2020-03-27 RX ADMIN — VANCOMYCIN HYDROCHLORIDE SCH MLS/HR: 1 INJECTION, POWDER, LYOPHILIZED, FOR SOLUTION INTRAVENOUS at 10:47

## 2020-03-27 NOTE — NUR
NUTRITION ASSESSMENT NOTES



Please refer to link notes of nutrition screen form filed under the intervention section of 
the plan of care for further details.



Est. Energy Needs: 4888-5597 kcal (12-15 kcal/kg BW).

Est. Protein Needs: 90-99 gms/day (1.0-1.1 gms/kg Adj.BW). 



Will continue to monitor pertinent labs and reassess nutrient need prn

-------------------------------------------------------------------------------

Addendum: 03/27/20 at 1500 by YAHIR ACOSTA RD

-------------------------------------------------------------------------------

Amended: Links added.

## 2020-03-27 NOTE — NUR
new orders received from hospitalist Montaño



Potassium 40 Alex PO once.

orders read back and received.

## 2020-03-27 NOTE — NUR
Discharge

Went over paperwork with patient. IVs and ML removed intact. Prescriptions were called into 
the pharmacy by the MD. Telemetry previously sent to ICU per hospital protocol. ID bands 
removed. Patient was picked up by wife in personal vehicle. He was taken out to the car in a 
wheelchair with all personal belongings.

## 2020-03-27 NOTE — NUR
closing note



pt A&Ox4.  respirations are even and non labored.  pt denies any pain or discomfort at this 
time.  bed is in low locked position, call light within reach.

## 2020-03-27 NOTE — NUR
FLOWER MOSQUERA/MD TO [place]

Spoke with Dr. Perales regarding potassium level of 3.5. He stated he would discharge the 
patient home. He said to go ahead and remove the Fry catheter. Catheter was removed, 
intact, with no problem. Telemetry was removed and sent to ICU per hospital protocol.